# Patient Record
Sex: MALE | Race: WHITE | NOT HISPANIC OR LATINO | URBAN - METROPOLITAN AREA
[De-identification: names, ages, dates, MRNs, and addresses within clinical notes are randomized per-mention and may not be internally consistent; named-entity substitution may affect disease eponyms.]

---

## 2019-12-26 ENCOUNTER — INPATIENT (INPATIENT)
Facility: HOSPITAL | Age: 64
LOS: 0 days | Discharge: HOME | DRG: 247 | End: 2019-12-27
Attending: INTERNAL MEDICINE | Admitting: INTERNAL MEDICINE
Payer: COMMERCIAL

## 2019-12-26 VITALS
HEIGHT: 68 IN | DIASTOLIC BLOOD PRESSURE: 87 MMHG | OXYGEN SATURATION: 97 % | SYSTOLIC BLOOD PRESSURE: 133 MMHG | RESPIRATION RATE: 16 BRPM | TEMPERATURE: 98 F | WEIGHT: 214.07 LBS | HEART RATE: 56 BPM

## 2019-12-26 DIAGNOSIS — I10 ESSENTIAL (PRIMARY) HYPERTENSION: ICD-10-CM

## 2019-12-26 DIAGNOSIS — E78.2 MIXED HYPERLIPIDEMIA: ICD-10-CM

## 2019-12-26 DIAGNOSIS — Z98.890 OTHER SPECIFIED POSTPROCEDURAL STATES: Chronic | ICD-10-CM

## 2019-12-26 DIAGNOSIS — I25.110 ATHEROSCLEROTIC HEART DISEASE OF NATIVE CORONARY ARTERY WITH UNSTABLE ANGINA PECTORIS: ICD-10-CM

## 2019-12-26 LAB
ALBUMIN SERPL ELPH-MCNC: 4.6 G/DL — SIGNIFICANT CHANGE UP (ref 3.3–5)
ALP SERPL-CCNC: 44 U/L — SIGNIFICANT CHANGE UP (ref 40–120)
ALT FLD-CCNC: 22 U/L — SIGNIFICANT CHANGE UP (ref 10–45)
ANION GAP SERPL CALC-SCNC: 11 MMOL/L — SIGNIFICANT CHANGE UP (ref 5–17)
APTT BLD: 61.7 SEC — HIGH (ref 27.5–36.3)
AST SERPL-CCNC: 14 U/L — SIGNIFICANT CHANGE UP (ref 10–40)
BASOPHILS # BLD AUTO: 0.01 K/UL — SIGNIFICANT CHANGE UP (ref 0–0.2)
BASOPHILS NFR BLD AUTO: 0.1 % — SIGNIFICANT CHANGE UP (ref 0–2)
BILIRUB SERPL-MCNC: 0.5 MG/DL — SIGNIFICANT CHANGE UP (ref 0.2–1.2)
BUN SERPL-MCNC: 18 MG/DL — SIGNIFICANT CHANGE UP (ref 7–23)
CALCIUM SERPL-MCNC: 9.7 MG/DL — SIGNIFICANT CHANGE UP (ref 8.4–10.5)
CHLORIDE SERPL-SCNC: 102 MMOL/L — SIGNIFICANT CHANGE UP (ref 96–108)
CHOLEST SERPL-MCNC: 150 MG/DL — SIGNIFICANT CHANGE UP (ref 10–199)
CK MB CFR SERPL CALC: 1.1 NG/ML — SIGNIFICANT CHANGE UP (ref 0–6.7)
CK SERPL-CCNC: 41 U/L — SIGNIFICANT CHANGE UP (ref 30–200)
CO2 SERPL-SCNC: 28 MMOL/L — SIGNIFICANT CHANGE UP (ref 22–31)
CREAT SERPL-MCNC: 1.13 MG/DL — SIGNIFICANT CHANGE UP (ref 0.5–1.3)
EOSINOPHIL # BLD AUTO: 0.15 K/UL — SIGNIFICANT CHANGE UP (ref 0–0.5)
EOSINOPHIL NFR BLD AUTO: 1.9 % — SIGNIFICANT CHANGE UP (ref 0–6)
GLUCOSE SERPL-MCNC: 90 MG/DL — SIGNIFICANT CHANGE UP (ref 70–99)
HBA1C BLD-MCNC: 5.3 % — SIGNIFICANT CHANGE UP (ref 4–5.6)
HCT VFR BLD CALC: 44.9 % — SIGNIFICANT CHANGE UP (ref 39–50)
HDLC SERPL-MCNC: 62 MG/DL — SIGNIFICANT CHANGE UP
HGB BLD-MCNC: 14.9 G/DL — SIGNIFICANT CHANGE UP (ref 13–17)
IMM GRANULOCYTES NFR BLD AUTO: 0.4 % — SIGNIFICANT CHANGE UP (ref 0–1.5)
LIPID PNL WITH DIRECT LDL SERPL: 63 MG/DL — SIGNIFICANT CHANGE UP
LYMPHOCYTES # BLD AUTO: 2.18 K/UL — SIGNIFICANT CHANGE UP (ref 1–3.3)
LYMPHOCYTES # BLD AUTO: 27.4 % — SIGNIFICANT CHANGE UP (ref 13–44)
MCHC RBC-ENTMCNC: 29.4 PG — SIGNIFICANT CHANGE UP (ref 27–34)
MCHC RBC-ENTMCNC: 33.2 GM/DL — SIGNIFICANT CHANGE UP (ref 32–36)
MCV RBC AUTO: 88.6 FL — SIGNIFICANT CHANGE UP (ref 80–100)
MONOCYTES # BLD AUTO: 0.77 K/UL — SIGNIFICANT CHANGE UP (ref 0–0.9)
MONOCYTES NFR BLD AUTO: 9.7 % — SIGNIFICANT CHANGE UP (ref 2–14)
NEUTROPHILS # BLD AUTO: 4.82 K/UL — SIGNIFICANT CHANGE UP (ref 1.8–7.4)
NEUTROPHILS NFR BLD AUTO: 60.5 % — SIGNIFICANT CHANGE UP (ref 43–77)
NRBC # BLD: 0 /100 WBCS — SIGNIFICANT CHANGE UP (ref 0–0)
PLATELET # BLD AUTO: 169 K/UL — SIGNIFICANT CHANGE UP (ref 150–400)
POTASSIUM SERPL-MCNC: 4.2 MMOL/L — SIGNIFICANT CHANGE UP (ref 3.5–5.3)
POTASSIUM SERPL-SCNC: 4.2 MMOL/L — SIGNIFICANT CHANGE UP (ref 3.5–5.3)
PROT SERPL-MCNC: 7.3 G/DL — SIGNIFICANT CHANGE UP (ref 6–8.3)
RBC # BLD: 5.07 M/UL — SIGNIFICANT CHANGE UP (ref 4.2–5.8)
RBC # FLD: 13.5 % — SIGNIFICANT CHANGE UP (ref 10.3–14.5)
SODIUM SERPL-SCNC: 141 MMOL/L — SIGNIFICANT CHANGE UP (ref 135–145)
TOTAL CHOLESTEROL/HDL RATIO MEASUREMENT: 2.4 RATIO — LOW (ref 3.4–9.6)
TRIGL SERPL-MCNC: 125 MG/DL — SIGNIFICANT CHANGE UP (ref 10–149)
WBC # BLD: 7.96 K/UL — SIGNIFICANT CHANGE UP (ref 3.8–10.5)
WBC # FLD AUTO: 7.96 K/UL — SIGNIFICANT CHANGE UP (ref 3.8–10.5)

## 2019-12-26 PROCEDURE — 99222 1ST HOSP IP/OBS MODERATE 55: CPT

## 2019-12-26 PROCEDURE — 93458 L HRT ARTERY/VENTRICLE ANGIO: CPT | Mod: 26,59

## 2019-12-26 PROCEDURE — 92928 PRQ TCAT PLMT NTRAC ST 1 LES: CPT | Mod: RC

## 2019-12-26 RX ORDER — PRAMIPEXOLE DIHYDROCHLORIDE 0.12 MG/1
0.12 TABLET ORAL DAILY
Refills: 0 | Status: DISCONTINUED | OUTPATIENT
Start: 2019-12-27 | End: 2019-12-27

## 2019-12-26 RX ORDER — TELMISARTAN AND HYDROCHLOROTHIAZIDE 40; 12.5 MG/1; MG/1
1 TABLET ORAL
Qty: 0 | Refills: 0 | DISCHARGE

## 2019-12-26 RX ORDER — DULOXETINE HYDROCHLORIDE 30 MG/1
60 CAPSULE, DELAYED RELEASE ORAL DAILY
Refills: 0 | Status: DISCONTINUED | OUTPATIENT
Start: 2019-12-26 | End: 2019-12-27

## 2019-12-26 RX ORDER — SODIUM CHLORIDE 9 MG/ML
500 INJECTION INTRAMUSCULAR; INTRAVENOUS; SUBCUTANEOUS
Refills: 0 | Status: DISCONTINUED | OUTPATIENT
Start: 2019-12-26 | End: 2019-12-26

## 2019-12-26 RX ORDER — TADALAFIL 10 MG/1
1 TABLET, FILM COATED ORAL
Qty: 0 | Refills: 0 | DISCHARGE

## 2019-12-26 RX ORDER — ALBUTEROL 90 UG/1
2 AEROSOL, METERED ORAL
Qty: 0 | Refills: 0 | DISCHARGE

## 2019-12-26 RX ORDER — ASPIRIN/CALCIUM CARB/MAGNESIUM 324 MG
81 TABLET ORAL DAILY
Refills: 0 | Status: DISCONTINUED | OUTPATIENT
Start: 2019-12-27 | End: 2019-12-27

## 2019-12-26 RX ORDER — DULOXETINE HYDROCHLORIDE 30 MG/1
1 CAPSULE, DELAYED RELEASE ORAL
Qty: 0 | Refills: 0 | DISCHARGE

## 2019-12-26 RX ORDER — PRAMIPEXOLE DIHYDROCHLORIDE 0.12 MG/1
1 TABLET ORAL
Qty: 0 | Refills: 0 | DISCHARGE

## 2019-12-26 RX ORDER — CLOPIDOGREL BISULFATE 75 MG/1
75 TABLET, FILM COATED ORAL DAILY
Refills: 0 | Status: DISCONTINUED | OUTPATIENT
Start: 2019-12-27 | End: 2019-12-27

## 2019-12-26 RX ORDER — SODIUM CHLORIDE 9 MG/ML
500 INJECTION INTRAMUSCULAR; INTRAVENOUS; SUBCUTANEOUS
Refills: 0 | Status: DISCONTINUED | OUTPATIENT
Start: 2019-12-26 | End: 2019-12-27

## 2019-12-26 RX ORDER — CHLORHEXIDINE GLUCONATE 213 G/1000ML
1 SOLUTION TOPICAL ONCE
Refills: 0 | Status: DISCONTINUED | OUTPATIENT
Start: 2019-12-26 | End: 2019-12-27

## 2019-12-26 RX ORDER — GABAPENTIN 400 MG/1
1 CAPSULE ORAL
Qty: 0 | Refills: 0 | DISCHARGE

## 2019-12-26 RX ORDER — CLONAZEPAM 1 MG
1 TABLET ORAL
Qty: 0 | Refills: 0 | DISCHARGE

## 2019-12-26 RX ORDER — HYDROCHLOROTHIAZIDE 25 MG
25 TABLET ORAL DAILY
Refills: 0 | Status: DISCONTINUED | OUTPATIENT
Start: 2019-12-27 | End: 2019-12-27

## 2019-12-26 RX ORDER — ALBUTEROL 90 UG/1
2 AEROSOL, METERED ORAL EVERY 6 HOURS
Refills: 0 | Status: DISCONTINUED | OUTPATIENT
Start: 2019-12-26 | End: 2019-12-27

## 2019-12-26 RX ORDER — PANTOPRAZOLE SODIUM 20 MG/1
40 TABLET, DELAYED RELEASE ORAL
Refills: 0 | Status: DISCONTINUED | OUTPATIENT
Start: 2019-12-26 | End: 2019-12-27

## 2019-12-26 RX ORDER — FLUTICASONE PROPIONATE AND SALMETEROL 50; 250 UG/1; UG/1
2 POWDER ORAL; RESPIRATORY (INHALATION)
Qty: 0 | Refills: 0 | DISCHARGE

## 2019-12-26 RX ORDER — GABAPENTIN 400 MG/1
300 CAPSULE ORAL THREE TIMES A DAY
Refills: 0 | Status: DISCONTINUED | OUTPATIENT
Start: 2019-12-26 | End: 2019-12-27

## 2019-12-26 RX ORDER — ATORVASTATIN CALCIUM 80 MG/1
40 TABLET, FILM COATED ORAL AT BEDTIME
Refills: 0 | Status: DISCONTINUED | OUTPATIENT
Start: 2019-12-26 | End: 2019-12-27

## 2019-12-26 RX ORDER — MELOXICAM 15 MG/1
1 TABLET ORAL
Qty: 0 | Refills: 0 | DISCHARGE

## 2019-12-26 RX ORDER — PANTOPRAZOLE SODIUM 20 MG/1
1 TABLET, DELAYED RELEASE ORAL
Qty: 0 | Refills: 0 | DISCHARGE

## 2019-12-26 RX ORDER — BUDESONIDE AND FORMOTEROL FUMARATE DIHYDRATE 160; 4.5 UG/1; UG/1
2 AEROSOL RESPIRATORY (INHALATION)
Refills: 0 | Status: DISCONTINUED | OUTPATIENT
Start: 2019-12-26 | End: 2019-12-27

## 2019-12-26 RX ADMIN — BUDESONIDE AND FORMOTEROL FUMARATE DIHYDRATE 2 PUFF(S): 160; 4.5 AEROSOL RESPIRATORY (INHALATION) at 22:18

## 2019-12-26 RX ADMIN — DULOXETINE HYDROCHLORIDE 60 MILLIGRAM(S): 30 CAPSULE, DELAYED RELEASE ORAL at 22:18

## 2019-12-26 RX ADMIN — ATORVASTATIN CALCIUM 40 MILLIGRAM(S): 80 TABLET, FILM COATED ORAL at 22:18

## 2019-12-26 RX ADMIN — SODIUM CHLORIDE 75 MILLILITER(S): 9 INJECTION INTRAMUSCULAR; INTRAVENOUS; SUBCUTANEOUS at 20:00

## 2019-12-26 RX ADMIN — GABAPENTIN 300 MILLIGRAM(S): 400 CAPSULE ORAL at 22:18

## 2019-12-26 NOTE — H&P ADULT - NSHPLABSRESULTS_GEN_ALL_CORE
EKG: sinus bradycardia at 54 bpm. EKG: sinus bradycardia at 54 bpm.  + Opioids at [facility]

## 2019-12-26 NOTE — H&P ADULT - NSICDXPASTMEDICALHX_GEN_ALL_CORE_FT
PAST MEDICAL HISTORY:  CAD (coronary artery disease)     Fatty liver disease, nonalcoholic     HTN (hypertension)     Hyperlipidemia

## 2019-12-26 NOTE — H&P ADULT - ASSESSMENT
64 y/o male with PMH of HTN, hyperlipidemia, fatty liver disease, CAD (s/p cath 2009) who presentslight of patient risk factors, CCS class IV anginal symptoms and known CAD  for recommended cardiac catheterization with interventions if indicated.     ASA: III Mallampatti: III  patient is a suitable candidate for moderate sedation    H/H 14.9/44.9 patient was already given the apporiate Aspirin and Plavix loading dose as well as daily dose of Aspirin 81mg and Plavix 75mg.  Cr: 1.13 patient started on 75ml/hr of IV fluids EF: 55% 62 y/o male with PMH of HTN, hyperlipidemia, fatty liver disease, CAD ((s/p PCI with stent at [facility] in [place] 2009) who presents light of patient risk factors, CCS class IV anginal symptoms and known CAD  for recommended cardiac catheterization with interventions if indicated.     ASA: III Mallampatti: III  patient is a suitable candidate for moderate sedation    H/H 14.9/44.9 patient was already given the appropriate Aspirin and Plavix loading dose as well as daily dose of Aspirin 81mg and Plavix 75mg.  Cr: 1.13 patient started on 75ml/hr of IV fluids EF: 55%    Consent was obtained with the help of [employer]  #[id number]  Risks & benefits of procedure and alternative therapy have been explained to the patient including but not limited to: allergic reaction, bleeding w/possible need for blood transfusion, infection, renal and vascular compromise, limb damage, arrhythmia, stroke, vessel dissection/perforation, Myocardial infarction, emergent CABG. Informed consent obtained and in chart. 64 y/o polish speaking male with PMH of HTN, hyperlipidemia, fatty liver disease, CAD (s/p PCI with stent at [facility] in [place] 2009) who presented to [facility] for substernal chest pain and shortness of breath that woke him up from rest at 1:30AM on 12/25/19. Chest pain was 8/10 in severity and radiated to abdomen and neck, Patient took tums and pantoprazaole with out relief of chest pain. Patient took his blood pressure at home and noted it was 220/183. On 12/24/19 patient endorsed he was feeling short of breath and easily fatigued. Patient denies chest pain with exertion. patient states the pain he experienced was similar to the pain he had prior to his last catheterization however this is more intense. The chest pain resolved in the ER. At [facility]. Patient denies shortness of breath, palpitations Dizziness, syncope, PND, BELTRÁN, Le edema.  troponin I <0.015 x 2, EKG: sinus bradycardia with incomplete RBBB. Echo was done showing EF: 55-60%, mild MR, RVSP <35mmHg. Patient was loaded with Plavix 600mg x1 and Aspirin 325mg x 1on 12/25/19 as discussed with Dr. Terry, patient received Plavix 75mg x 1 and Aspirin 325mg x 1 prior to transfer to [facility] 12/26/19. Patient also started on a heparin drip.       ASA: III Mallampatti: III  patient is a suitable candidate for moderate sedation    H/H 14.9/44.9 patient was already given the appropriate Aspirin and Plavix loading dose as well as daily dose of Aspirin 81mg and Plavix 75mg.  Cr: 1.13 patient started on 75ml/hr of IV fluids EF: 55%    Consent was obtained with the help of [employer]  #[id number]  Risks & benefits of procedure and alternative therapy have been explained to the patient including but not limited to: allergic reaction, bleeding w/possible need for blood transfusion, infection, renal and vascular compromise, limb damage, arrhythmia, stroke, vessel dissection/perforation, Myocardial infarction, emergent CABG. Informed consent obtained and in chart. 62 y/o polish speaking male with PMH of HTN, hyperlipidemia, fatty liver disease, anxiety, neuropathy, CAD (s/p PCI with stent at [facility] in [place] 2009) who presented to [facility] for substernal chest pain and shortness of breath that woke him up from rest at 1:30AM on 12/25/19. Chest pain was 8/10 in severity and radiated to abdomen and neck, Patient took tums and pantoprazaole with out relief of chest pain. Patient took his blood pressure at home and noted it was 220/183. On 12/24/19 patient endorsed he was feeling short of breath and easily fatigued. Patient denies chest pain with exertion. patient states the pain he experienced was similar to the pain he had prior to his last catheterization however this is more intense. The chest pain resolved in the ER. At [facility]. Patient denies shortness of breath, palpitations Dizziness, syncope, PND, BELTRÁN, Le edema.  troponin I <0.015 x 2, EKG: sinus bradycardia with incomplete RBBB. Echo was done showing EF: 55-60%, mild MR, RVSP <35mmHg. Patient was loaded with Plavix 600mg x1 and Aspirin 325mg x 1on 12/25/19 as discussed with Dr. Terry, patient received Plavix 75mg x 1 and Aspirin 325mg x 1 prior to transfer to [facility] 12/26/19. Patient also started on a heparin drip.       ASA: III Mallampatti: III  patient is a suitable candidate for moderate sedation    H/H 14.9/44.9 patient was already given the appropriate Aspirin and Plavix loading dose as well as daily dose of Aspirin 81mg and Plavix 75mg.  Cr: 1.13 patient started on 75ml/hr of IV fluids EF: 55%    Consent was obtained with the help of [employer]  #[id number]  Risks & benefits of procedure and alternative therapy have been explained to the patient including but not limited to: allergic reaction, bleeding w/possible need for blood transfusion, infection, renal and vascular compromise, limb damage, arrhythmia, stroke, vessel dissection/perforation, Myocardial infarction, emergent CABG. Informed consent obtained and in chart.

## 2019-12-26 NOTE — H&P ADULT - ATTENDING COMMENTS
On Date 12/26/19  Assessment: Patient personally seen and examined myself during rounds, face-to-face, with the NPP     Note read, including vitals, physical findings, laboratory data, and radiological reports.   Agree with above with revisions, if any included below.   - My exam:  General: WN/WD NAD  Neurology: A&Ox3, nonfocal, FRAGOSO x 4  Head:  Normocephalic, atraumatic  ENT:  Mucosa moist, no ulcerations  Neck:  Supple, no sinuses or palpable masses  Lymphatic:  No palpable cervical, supraclavicular, axillary or inguinal adenopathy  Respiratory: CTA B/L  CV: RRR, S1S2, no murmur  Abdominal: Soft, NT, ND no palpable mass  MSK: No edema, + peripheral pulses, FROM all 4 extremity  Incisions: intact, no erythema or drainage    - My Plan of care:   Continuous telemetry monitoring, frequent hemodynamic checks, daily weights, I/Os, daily 12 Lead ECG, add K and Mg to labs, cycle troponin to peak, consider Adv HF / EP / CTS / Interv. Cardio consultation if intervention is needed.

## 2019-12-26 NOTE — H&P ADULT - HISTORY OF PRESENT ILLNESS
64 y/o male with PMH of HTN, who presented to [facility] for chest pain    At [place] patient recieved....     Patient denies shortness of breath, palipatations, Dizziness, syncope, PND, BELTRÁN, Le edema.     In light of patient risk factors, CCS class IV anginal symptoms and*** patient now presents for recommended cardiac catheterization with interventions if indicated. 64 y/o male with PMH of HTN, hyperlipidemia, fatty liver disease, CAD (s/p cath 2009) who presented to [facility] for substernal chest pain and shortness of breath that woke him up from rest at 1:30AM on 12/25/19. Chest pain was 8/10 in severity and radiated to abdomen and neck, Patient took tums and pantoprazaole with out relief of chest pain. Patient took his blood pressure at home and noted it was 220/183. On 12/24/19 patient endorsed he was feeling short of breath and easily fatigued. Patient denies chest pain with exertion. patient states the pain he experienced was similar to the pain he had prior to his last catheterization however this is more intense. The chest pain resolved in the ER. At [facility]. Patient denies shortness of breath, palpitations Dizziness, syncope, PND, BELTRÁN, Le edema.  troponin I <0.015 x 2, EKG: sinus bradycardia with incomplete RBBB. Echo was done showing EF: 55-60%, mild MR, RVSP <35mmHg. Patient was loaded with Plavix 600mg x1 and Aspirin 325mg x 1on 12/25/19 as discussed with Dr. Terry, patient received Plavix 75mg x 1 and Aspirin 325mg x 1 prior to transfer to [facility] 12/26/19    In light of patient risk factors, CCS class IV anginal symptoms and known CAD patient now presents for recommended cardiac catheterization with interventions if indicated. 62 y/o polis speaking male with PMH of HTN, hyperlipidemia, fatty liver disease, CAD (s/p cath 2009) who presented to [facility] for substernal chest pain and shortness of breath that woke him up from rest at 1:30AM on 12/25/19. Chest pain was 8/10 in severity and radiated to abdomen and neck, Patient took tums and pantoprazaole with out relief of chest pain. Patient took his blood pressure at home and noted it was 220/183. On 12/24/19 patient endorsed he was feeling short of breath and easily fatigued. Patient denies chest pain with exertion. patient states the pain he experienced was similar to the pain he had prior to his last catheterization however this is more intense. The chest pain resolved in the ER. At [facility]. Patient denies shortness of breath, palpitations Dizziness, syncope, PND, BELTRÁN, Le edema.  troponin I <0.015 x 2, EKG: sinus bradycardia with incomplete RBBB. Echo was done showing EF: 55-60%, mild MR, RVSP <35mmHg. Patient was loaded with Plavix 600mg x1 and Aspirin 325mg x 1on 12/25/19 as discussed with Dr. Terry, patient received Plavix 75mg x 1 and Aspirin 325mg x 1 prior to transfer to [facility] 12/26/19. Patient also started on a heparin drip.     In light of patient risk factors, CCS class IV anginal symptoms and known CAD patient now presents for recommended cardiac catheterization with interventions if indicated. 62 y/o polish speaking male with PMH of HTN, hyperlipidemia, fatty liver disease, CAD (s/p PCI with stent at [facility] in [place] 2009) who presented to [facility] for substernal chest pain and shortness of breath that woke him up from rest at 1:30AM on 12/25/19. Chest pain was 8/10 in severity and radiated to abdomen and neck, Patient took tums and pantoprazaole with out relief of chest pain. Patient took his blood pressure at home and noted it was 220/183. On 12/24/19 patient endorsed he was feeling short of breath and easily fatigued. Patient denies chest pain with exertion. patient states the pain he experienced was similar to the pain he had prior to his last catheterization however this is more intense. The chest pain resolved in the ER. At [facility]. Patient denies shortness of breath, palpitations Dizziness, syncope, PND, BELTRÁN, Le edema.  troponin I <0.015 x 2, EKG: sinus bradycardia with incomplete RBBB. Echo was done showing EF: 55-60%, mild MR, RVSP <35mmHg. Patient was loaded with Plavix 600mg x1 and Aspirin 325mg x 1on 12/25/19 as discussed with Dr. Terry, patient received Plavix 75mg x 1 and Aspirin 325mg x 1 prior to transfer to [facility] 12/26/19. Patient also started on a heparin drip.     At [facility] patient currently asymptomatic, he denies chest pain, shortness of breath, palpitations Dizziness, syncope, PND, BELTRÁN, Le edema.  In light of patient risk factors, CCS class IV anginal symptoms and known CAD patient now presents for recommended cardiac catheterization with interventions if indicated.     Transfer medications: Heparin drip, Aspirin 81mg, Plavix 75mg 62 y/o polish speaking male with PMH of HTN, hyperlipidemia, fatty liver disease, CAD (s/p PCI with stent at [facility] in [place] 2009) who presented to [facility] for substernal chest pain and shortness of breath that woke him up from rest at 1:30AM on 12/25/19. Chest pain was 8/10 in severity and radiated to abdomen and neck, Patient took tums and pantoprazaole with out relief of chest pain. Patient took his blood pressure at home and noted it was 220/183. On 12/24/19 patient endorsed he was feeling short of breath and easily fatigued. Patient denies chest pain with exertion. patient states the pain he experienced was similar to the pain he had prior to his last catheterization however this is more intense. The chest pain resolved in the ER. At [facility]. Patient denies shortness of breath, palpitations Dizziness, syncope, PND, BELTRÁN, Le edema.  troponin I <0.015 x 2, EKG: sinus bradycardia with incomplete RBBB. Echo was done showing EF: 55-60%, mild MR, RVSP <35mmHg. Patient was loaded with Plavix 600mg x1 and Aspirin 325mg x 1on 12/25/19 as discussed with Dr. Terry, patient received Plavix 75mg x 1 and Aspirin 325mg x 1 prior to transfer to [facility] 12/26/19. Patient also started on a heparin drip.     At [facility] patient currently asymptomatic, he denies chest pain, shortness of breath, palpitations Dizziness, syncope, PND, BELTRÁN, Le edema.  In light of patient risk factors, CCS class IV anginal symptoms and known CAD patient now presents for recommended cardiac catheterization with interventions if indicated.     Transfer medications: Heparin drip, Aspirin 81mg daily, Plavix 75mg daily, Gabapentin 300mg three times a day, Telmisartan-Hydrochlorothiazide 80mg-25mg daily, Budesonide 32mcg/inhaled 1 spray per day, Ventolin HFA 90mcg/inhal 2 puffs 4 times a day as needed, Cymbalta 60mg 2 capsules daily, pantoprazole 40mg delayed release, clonazepam 0.5mg 1 tab 2 times a day. Ativan 5mg daily as needed for anxiety. 64 y/o polish speaking male with PMH of HTN, hyperlipidemia, fatty liver disease, CAD (s/p PCI with stent at [facility] in [place] 2009) who presented to [facility] for substernal chest pain and shortness of breath that woke him up from rest at 1:30AM on 12/25/19. Chest pain was 8/10 in severity and radiated to abdomen and neck, Patient took tums and pantoprazaole with out relief of chest pain. Patient took his blood pressure at home and noted it was 220/183. On 12/24/19 patient endorsed he was feeling short of breath and easily fatigued. Patient denies chest pain with exertion. patient states the pain he experienced was similar to the pain he had prior to his last catheterization however this is more intense. The chest pain resolved in the ER. At [facility]. Patient denies shortness of breath, palpitations Dizziness, syncope, PND, BELTRÁN, Le edema.  troponin I <0.015 x 2, EKG: sinus bradycardia with incomplete RBBB. Echo was done showing EF: 55-60%, mild MR, RVSP <35mmHg. Patient was loaded with Plavix 600mg x1 and Aspirin 325mg x 1on 12/25/19 as discussed with Dr. Terry, patient received Plavix 75mg x 1 and Aspirin 325mg x 1 prior to transfer to [facility] 12/26/19. Patient also started on a heparin drip.     At [facility] patient currently asymptomatic, he denies chest pain, shortness of breath, palpitations Dizziness, syncope, PND, BELTRÁN, Le edema.  In light of patient risk factors, CCS class IV anginal symptoms and known CAD patient now presents for recommended cardiac catheterization with interventions if indicated.     Transfer medications: Heparin drip, Aspirin 81mg daily, Plavix 75mg daily, Gabapentin 300mg three times a day, Telmisartan-Hydrochlorothiazide 80mg-25mg daily, Budesonide 32mcg/inhaled 1 spray per day, atorvastatin 80mg daily, Ventolin HFA 90mcg/inhal 2 puffs 4 times a day as needed, Cymbalta 60mg 2 capsules daily, pantoprazole 40mg delayed release, clonazepam 0.5mg 1 tab 2 times a day. Ativan 5mg daily as needed for anxiety. 64 y/o polish speaking male with PMH of HTN, hyperlipidemia, fatty liver disease, anxiety, neuropathy, CAD (s/p PCI with stent at [facility] in [place] 2009) who presented to [facility] for substernal chest pain and shortness of breath that woke him up from rest at 1:30AM on 12/25/19. Chest pain was 8/10 in severity and radiated to abdomen and neck, Patient took tums and pantoprazaole with out relief of chest pain. Patient took his blood pressure at home and noted it was 220/183. On 12/24/19 patient endorsed he was feeling short of breath and easily fatigued. Patient denies chest pain with exertion. patient states the pain he experienced was similar to the pain he had prior to his last catheterization however this is more intense. The chest pain resolved in the ER. At [facility]. Patient denies shortness of breath, palpitations Dizziness, syncope, PND, BELTRÁN, Le edema.  troponin I <0.015 x 2, EKG: sinus bradycardia with incomplete RBBB. Echo was done showing EF: 55-60%, mild MR, RVSP <35mmHg. Patient was loaded with Plavix 600mg x1 and Aspirin 325mg x 1on 12/25/19 as discussed with Dr. Terry, patient received Plavix 75mg x 1 and Aspirin 325mg x 1 prior to transfer to [facility] 12/26/19. Patient also started on a heparin drip.     At [facility] patient currently asymptomatic, he denies chest pain, shortness of breath, palpitations Dizziness, syncope, PND, BELTRÁN, Le edema.  In light of patient risk factors, CCS class IV anginal symptoms and known CAD patient now presents for recommended cardiac catheterization with interventions if indicated.     Transfer medications: Heparin drip, Aspirin 81mg daily, Plavix 75mg daily, Gabapentin 300mg three times a day, Telmisartan-Hydrochlorothiazide 80mg-25mg daily, Budesonide 32mcg/inhaled 1 spray per day, atorvastatin 80mg daily, Ventolin HFA 90mcg/inhal 2 puffs 4 times a day as needed, Cymbalta 60mg 2 capsules daily, pantoprazole 40mg delayed release, clonazepam 0.5mg 1 tab 2 times a day. Ativan 5mg daily as needed for anxiety.

## 2019-12-26 NOTE — H&P ADULT - PROBLEM SELECTOR PLAN 1
chest pain at rest on 12/25/19  troponin <0.0015 x 2  s/p cardiac cath 12/26/19 chest pain at rest on 12/25/19  troponin <0.0015 x 2  s/p cardiac cath 12/26/19  patient to continue Aspirin 81mg daily and Plavix 75mg daily

## 2019-12-27 ENCOUNTER — TRANSCRIPTION ENCOUNTER (OUTPATIENT)
Age: 64
End: 2019-12-27

## 2019-12-27 VITALS — TEMPERATURE: 98 F

## 2019-12-27 LAB
ANION GAP SERPL CALC-SCNC: 10 MMOL/L — SIGNIFICANT CHANGE UP (ref 5–17)
BUN SERPL-MCNC: 19 MG/DL — SIGNIFICANT CHANGE UP (ref 7–23)
CALCIUM SERPL-MCNC: 9.4 MG/DL — SIGNIFICANT CHANGE UP (ref 8.4–10.5)
CHLORIDE SERPL-SCNC: 105 MMOL/L — SIGNIFICANT CHANGE UP (ref 96–108)
CO2 SERPL-SCNC: 27 MMOL/L — SIGNIFICANT CHANGE UP (ref 22–31)
CREAT SERPL-MCNC: 1 MG/DL — SIGNIFICANT CHANGE UP (ref 0.5–1.3)
GLUCOSE SERPL-MCNC: 107 MG/DL — HIGH (ref 70–99)
HCT VFR BLD CALC: 42.3 % — SIGNIFICANT CHANGE UP (ref 39–50)
HCV AB S/CO SERPL IA: 0.09 S/CO — SIGNIFICANT CHANGE UP
HCV AB SERPL-IMP: SIGNIFICANT CHANGE UP
HGB BLD-MCNC: 14.1 G/DL — SIGNIFICANT CHANGE UP (ref 13–17)
MAGNESIUM SERPL-MCNC: 2 MG/DL — SIGNIFICANT CHANGE UP (ref 1.6–2.6)
MCHC RBC-ENTMCNC: 29.4 PG — SIGNIFICANT CHANGE UP (ref 27–34)
MCHC RBC-ENTMCNC: 33.3 GM/DL — SIGNIFICANT CHANGE UP (ref 32–36)
MCV RBC AUTO: 88.3 FL — SIGNIFICANT CHANGE UP (ref 80–100)
NRBC # BLD: 0 /100 WBCS — SIGNIFICANT CHANGE UP (ref 0–0)
PLATELET # BLD AUTO: 151 K/UL — SIGNIFICANT CHANGE UP (ref 150–400)
POTASSIUM SERPL-MCNC: 3.8 MMOL/L — SIGNIFICANT CHANGE UP (ref 3.5–5.3)
POTASSIUM SERPL-SCNC: 3.8 MMOL/L — SIGNIFICANT CHANGE UP (ref 3.5–5.3)
RBC # BLD: 4.79 M/UL — SIGNIFICANT CHANGE UP (ref 4.2–5.8)
RBC # FLD: 13.4 % — SIGNIFICANT CHANGE UP (ref 10.3–14.5)
SODIUM SERPL-SCNC: 142 MMOL/L — SIGNIFICANT CHANGE UP (ref 135–145)
WBC # BLD: 8.57 K/UL — SIGNIFICANT CHANGE UP (ref 3.8–10.5)
WBC # FLD AUTO: 8.57 K/UL — SIGNIFICANT CHANGE UP (ref 3.8–10.5)

## 2019-12-27 PROCEDURE — C1887: CPT

## 2019-12-27 PROCEDURE — 83036 HEMOGLOBIN GLYCOSYLATED A1C: CPT

## 2019-12-27 PROCEDURE — 85730 THROMBOPLASTIN TIME PARTIAL: CPT

## 2019-12-27 PROCEDURE — 82553 CREATINE MB FRACTION: CPT

## 2019-12-27 PROCEDURE — 80048 BASIC METABOLIC PNL TOTAL CA: CPT

## 2019-12-27 PROCEDURE — 93005 ELECTROCARDIOGRAM TRACING: CPT

## 2019-12-27 PROCEDURE — C1725: CPT

## 2019-12-27 PROCEDURE — 93010 ELECTROCARDIOGRAM REPORT: CPT

## 2019-12-27 PROCEDURE — C1769: CPT

## 2019-12-27 PROCEDURE — 85025 COMPLETE CBC W/AUTO DIFF WBC: CPT

## 2019-12-27 PROCEDURE — 83735 ASSAY OF MAGNESIUM: CPT

## 2019-12-27 PROCEDURE — 94640 AIRWAY INHALATION TREATMENT: CPT

## 2019-12-27 PROCEDURE — 82550 ASSAY OF CK (CPK): CPT

## 2019-12-27 PROCEDURE — 80053 COMPREHEN METABOLIC PANEL: CPT

## 2019-12-27 PROCEDURE — 36415 COLL VENOUS BLD VENIPUNCTURE: CPT

## 2019-12-27 PROCEDURE — C1874: CPT

## 2019-12-27 PROCEDURE — 86803 HEPATITIS C AB TEST: CPT

## 2019-12-27 PROCEDURE — 99238 HOSP IP/OBS DSCHRG MGMT 30/<: CPT

## 2019-12-27 PROCEDURE — 85027 COMPLETE CBC AUTOMATED: CPT

## 2019-12-27 PROCEDURE — C1894: CPT

## 2019-12-27 PROCEDURE — 80061 LIPID PANEL: CPT

## 2019-12-27 PROCEDURE — 85610 PROTHROMBIN TIME: CPT

## 2019-12-27 RX ORDER — CLOPIDOGREL BISULFATE 75 MG/1
1 TABLET, FILM COATED ORAL
Qty: 30 | Refills: 11
Start: 2019-12-27 | End: 2020-12-20

## 2019-12-27 RX ORDER — ASPIRIN/CALCIUM CARB/MAGNESIUM 324 MG
1 TABLET ORAL
Qty: 30 | Refills: 11
Start: 2019-12-27 | End: 2020-12-20

## 2019-12-27 RX ORDER — ASPIRIN/CALCIUM CARB/MAGNESIUM 324 MG
1 TABLET ORAL
Qty: 0 | Refills: 0 | DISCHARGE

## 2019-12-27 RX ORDER — ATORVASTATIN CALCIUM 80 MG/1
1 TABLET, FILM COATED ORAL
Qty: 30 | Refills: 11
Start: 2019-12-27 | End: 2020-12-20

## 2019-12-27 RX ADMIN — GABAPENTIN 300 MILLIGRAM(S): 400 CAPSULE ORAL at 05:45

## 2019-12-27 RX ADMIN — Medication 25 MILLIGRAM(S): at 05:48

## 2019-12-27 RX ADMIN — BUDESONIDE AND FORMOTEROL FUMARATE DIHYDRATE 2 PUFF(S): 160; 4.5 AEROSOL RESPIRATORY (INHALATION) at 05:45

## 2019-12-27 RX ADMIN — PANTOPRAZOLE SODIUM 40 MILLIGRAM(S): 20 TABLET, DELAYED RELEASE ORAL at 05:46

## 2019-12-27 RX ADMIN — CLOPIDOGREL BISULFATE 75 MILLIGRAM(S): 75 TABLET, FILM COATED ORAL at 10:52

## 2019-12-27 RX ADMIN — PRAMIPEXOLE DIHYDROCHLORIDE 0.12 MILLIGRAM(S): 0.12 TABLET ORAL at 10:53

## 2019-12-27 RX ADMIN — Medication 81 MILLIGRAM(S): at 10:52

## 2019-12-27 NOTE — DISCHARGE NOTE PROVIDER - CARE PROVIDER_API CALL
Ken Terry)  Cardiology; Internal Medicine  [address] [place] [address]  Phone: [phone]  Fax: [phone]  Follow Up Time:

## 2019-12-27 NOTE — DISCHARGE NOTE NURSING/CASE MANAGEMENT/SOCIAL WORK - PATIENT PORTAL LINK FT
You can access the FollowMyHealth Patient Portal offered by [facility] by registering at the following website: [email or web address]. By joining [employer]’s FollowMyHealth portal, you will also be able to view your health information using other applications (apps) compatible with our system.

## 2019-12-27 NOTE — DISCHARGE NOTE PROVIDER - INSTRUCTIONS
A Mediterranean Diet is recommended! Some suggestions include continue incorporating 2 or more servings per day of vegetables, fruits, and whole grains. Increase intake of fish and legumes/beans to 2 or more servings per week. Aim to increase intake of healthy fats, such as olive oil and avocados, and have a handful of nuts/seeds most days. Reduce red/processed meat consumption to 2 or fewer times per week.

## 2019-12-27 NOTE — DISCHARGE NOTE PROVIDER - HOSPITAL COURSE
62 y/o polish speaking male with PMH of HTN, hyperlipidemia, fatty liver disease, anxiety, neuropathy, CAD (s/p PCI with stent at [facility] in [place] 2009) who presented to [facility] for substernal chest pain and shortness of breath that woke him up from rest at 1:30AM on 12/25/19. Patient took his blood pressure at home and noted it was 220/183. On 12/24/19 patient endorsed he was feeling short of breath and easily fatigued. [facility] Troponin I <0.015 x 2, EKG: sinus bradycardia with incomplete RBBB. Echo was done showing EF: 55-60%, mild MR, RVSP <35mmHg. Patient was loaded with Plavix 600mg x1 and Aspirin 325mg x 1on 12/25/19 as discussed with Dr. Terry, patient received Plavix 75mg x 1 and Aspirin 325mg x 1 prior to transfer to [facility] 12/26/19. Patient also started on a heparin drip. Pt s/p cardiac catheterization on 12/26/19 with interventions WILBER x 1 pRCA, EF 50%, EDP 7 mmHg. Other findings LM normal, LAD normal, pLCx luminal irregularities. Right radial (TR) access. Pt seen and examined at bedside today. Access site stable; no bleeding, hematoma. Radial pulse 2+. Vitals, labs, and telemetry reviewed and all remained stable.  Pt case discussed with Dr. Saleem, who agrees pt is stable for discharge. Pt will continue hydrochlorothiazide 25mg daily along. Pt should start aspirin 81mg daily, Plavix 75mg daily, and atorvastatin 40mg daily. All discharge instructions reviewed with patient and medications e-prescribed to pharmacy. Pt is cleared for discharge per Dr. Saleem, and will follow up with Dr. Terry within 1-2 week of discharge.

## 2019-12-27 NOTE — DISCHARGE NOTE PROVIDER - NSDCMRMEDTOKEN_GEN_ALL_CORE_FT
Advair Diskus 100 mcg-50 mcg inhalation powder: 2 puff(s) inhaled 2 times a day  aspirin 81 mg oral tablet: 1 tab(s) orally once a day  atorvastatin 40 mg oral tablet: 1 tab(s) orally once a day (at bedtime)  Cialis 10 mg oral tablet: 1 tab(s) orally 4 times a week  clonazePAM 0.5 mg oral tablet: 1 tab(s) orally 2 times a day  clopidogrel 75 mg oral tablet: 1 tab(s) orally once a day  Cymbalta 60 mg oral delayed release capsule: 1 cap(s) orally once a day  gabapentin 300 mg oral capsule: 1 cap(s) orally 3 times a day  meloxicam 15 mg oral tablet: 1 tab(s) orally once a day  pantoprazole 40 mg oral delayed release tablet: 1 tab(s) orally once a day  pramipexole 0.125 mg oral tablet: 1 tab(s) orally once a day  telmisartan-hydrochlorothiazide 80mg-25mg oral tablet: 1 tab(s) orally once a day  Ventolin HFA 90 mcg/inh inhalation aerosol: 2 puff(s) inhaled 4 times a day

## 2019-12-31 DIAGNOSIS — I25.110 ATHEROSCLEROTIC HEART DISEASE OF NATIVE CORONARY ARTERY WITH UNSTABLE ANGINA PECTORIS: ICD-10-CM

## 2019-12-31 DIAGNOSIS — Z98.61 CORONARY ANGIOPLASTY STATUS: ICD-10-CM

## 2019-12-31 DIAGNOSIS — R07.9 CHEST PAIN, UNSPECIFIED: ICD-10-CM

## 2019-12-31 DIAGNOSIS — K76.0 FATTY (CHANGE OF) LIVER, NOT ELSEWHERE CLASSIFIED: ICD-10-CM

## 2019-12-31 DIAGNOSIS — I10 ESSENTIAL (PRIMARY) HYPERTENSION: ICD-10-CM

## 2019-12-31 DIAGNOSIS — G62.9 POLYNEUROPATHY, UNSPECIFIED: ICD-10-CM

## 2019-12-31 DIAGNOSIS — E78.5 HYPERLIPIDEMIA, UNSPECIFIED: ICD-10-CM

## 2019-12-31 DIAGNOSIS — F41.9 ANXIETY DISORDER, UNSPECIFIED: ICD-10-CM

## 2020-02-07 PROBLEM — K76.0 FATTY (CHANGE OF) LIVER, NOT ELSEWHERE CLASSIFIED: Chronic | Status: ACTIVE | Noted: 2019-12-26

## 2020-02-07 PROBLEM — I25.10 ATHEROSCLEROTIC HEART DISEASE OF NATIVE CORONARY ARTERY WITHOUT ANGINA PECTORIS: Chronic | Status: ACTIVE | Noted: 2019-12-26

## 2020-02-07 PROBLEM — I10 ESSENTIAL (PRIMARY) HYPERTENSION: Chronic | Status: ACTIVE | Noted: 2019-12-26

## 2020-02-07 PROBLEM — E78.5 HYPERLIPIDEMIA, UNSPECIFIED: Chronic | Status: ACTIVE | Noted: 2019-12-26

## 2020-02-24 PROBLEM — Z00.00 ENCOUNTER FOR PREVENTIVE HEALTH EXAMINATION: Status: ACTIVE | Noted: 2020-02-24

## 2020-02-26 ENCOUNTER — APPOINTMENT (OUTPATIENT)
Dept: PHYSICAL MEDICINE AND REHAB | Facility: CLINIC | Age: 65
End: 2020-02-26